# Patient Record
Sex: FEMALE | Race: WHITE | NOT HISPANIC OR LATINO | Employment: UNEMPLOYED | URBAN - NONMETROPOLITAN AREA
[De-identification: names, ages, dates, MRNs, and addresses within clinical notes are randomized per-mention and may not be internally consistent; named-entity substitution may affect disease eponyms.]

---

## 2020-01-22 ENCOUNTER — OFFICE VISIT (OUTPATIENT)
Dept: ORTHOPEDIC SURGERY | Facility: CLINIC | Age: 40
End: 2020-01-22

## 2020-01-22 VITALS — TEMPERATURE: 98.4 F | HEIGHT: 64 IN | WEIGHT: 139 LBS | BODY MASS INDEX: 23.73 KG/M2

## 2020-01-22 DIAGNOSIS — S93.402A SPRAIN OF LEFT ANKLE, UNSPECIFIED LIGAMENT, INITIAL ENCOUNTER: Primary | ICD-10-CM

## 2020-01-22 DIAGNOSIS — S93.602A FOOT SPRAIN, LEFT, INITIAL ENCOUNTER: ICD-10-CM

## 2020-01-22 PROCEDURE — 99203 OFFICE O/P NEW LOW 30 MIN: CPT | Performed by: PHYSICIAN ASSISTANT

## 2020-02-02 PROBLEM — S93.402A SPRAIN OF LEFT ANKLE: Status: ACTIVE | Noted: 2020-02-02

## 2020-02-02 PROBLEM — S93.602A FOOT SPRAIN, LEFT, INITIAL ENCOUNTER: Status: ACTIVE | Noted: 2020-02-02

## 2020-02-02 NOTE — PROGRESS NOTES
NEW VISIT    Patient: Yaquelin Elkins  ?  YOB: 1980    MRN: [id number]  ?  Chief Complaint   Patient presents with   • Left Foot - Establish Care, Pain      ?  HPI:   39 yr old female patient presents with complaint of left foot following injury on 1/7/20. She reports she twisted her ankle and her ankle touched the ground upon the incident. She was seen in the ED at [facility] where xrays off foot and ankle were obtained and she was placed into a cam walking boot for concern she may have a fracture. She states the boot helps her pain at times but other times it feels like her ankle is able to move around too much.    Pain Location: left foot and ankle   Radiation: none  Quality: aching  Intensity/Severity: moderate  Duration: since 1/7/20  Onset quality: sudden  Timing: intermittent  Aggravating Factors: standing  Alleviating Factors: rest, brace  Previous Episodes: none mentioned  Associated Symptoms: pain, swelling, bruising  ADLs Affected: ambulating    This patient is a new patient.  This problem is new to this examiner.      Allergies: Allergies no known allergies    Medications:   Home Medications:  No current outpatient medications on file prior to visit.     No current facility-administered medications on file prior to visit.      Current Medications:  Scheduled Meds:  PRN Meds:.    I have reviewed the patient's medical history in detail and updated the computerized patient record.  Review and summarization of old records include:    No past medical history on file.  No past surgical history on file.  Social History     Occupational History   • Not on file   Tobacco Use   • Smoking status: Not on file   Substance and Sexual Activity   • Alcohol use: Not on file   • Drug use: Not on file   • Sexual activity: Not on file      Social History     Social History Narrative   • Not on file     No family history on file.    Review of Systems  Constitutional: Negative.  Negative for fever.   Eyes: Negative.  "   Respiratory: Negative.    Cardiovascular: Negative.    Endocrine: Negative.    Musculoskeletal: Positive for joint pain, gait problem and joint swelling.   Skin: Negative.  Negative for rash and wound.   Allergic/Immunologic: Negative.    Neurological: Negative for numbness.   Hematological: Negative.    Psychiatric/Behavioral: Negative.           Wt Readings from Last 3 Encounters:   01/22/20 63 kg (139 lb)     Ht Readings from Last 3 Encounters:   01/22/20 162.6 cm (64\")     Body mass index is 23.86 kg/m².  Facility age limit for growth percentiles is 20 years.  Vitals:    01/22/20 1552   Temp: 98.4 °F (36.9 °C)         Physical Exam  Constitutional: Patient is oriented to person, place, and time. Appears well-developed and well-nourished.   HENT:   Head: Normocephalic and atraumatic.   Eyes: Conjunctivae and EOM are normal. Pupils are equal, round, and reactive to light.   Cardiovascular: Normal rate and intact distal pulses.   Pulmonary/Chest: Effort normal and breath sounds normal.   Musculoskeletal:   See detailed exam below   Neurological: Alert and oriented to person, place, and time. No sensory deficit. Coordination normal.   Skin: Skin is warm and dry. Capillary refill takes less than 2 seconds. No rash noted. No erythema.   Psychiatric: Patient has a normal mood and affect. Her behavior is normal. Judgment and thought content normal.   Nursing note and vitals reviewed.      Ortho Exam:   Left ankle/foot-sprain:   Patient has diffuse ill-defined tenderness and swelling over the anterior talofibular ligament. Inversion and eversion against resistance are painful. Some tenderness is noted posteriorly over the calcaneal fibular ligament as well. Medially the deltoid ligament is swollen and tender. Dorsalis pedis and posterior tibial artery pulses are palpable. Common peroneal nerve function is well preserved. There is no evidence of a proximal fibular injury. Distal tibiofibular syndesmotic ligament " appears to be intact. External rotation and squeeze tests are both negative.         Diagnostics:  Independently viewed and interpreted outside xrays of left foot and ankle, from 1/7/20 performed at [facility], my impression below:  · No acute bony abnormality in foot or ankle      Reviewed radiology report of xrays of left foot and ankle, from 1/7/20, summary of impression below:  · No acute bony abnormality       Assessment:  Yaquelin was seen today for establish care and pain.    Diagnoses and all orders for this visit:    Sprain of left ankle, unspecified ligament, initial encounter    Foot sprain, left, initial encounter    ?    Plan    · Patient was refitted for a smaller size cam walking boot and the boot received in the ER was exchanged for a smaller size. Patient is able to bear weight as tolerated with boot on. Since she has already been wearing a boot for 2 weeks I will have her return in 2 weeks to check her healing progress.  · Rest, ice, compression, and elevation (RICE) therapy  · Stretching and strengthening exercises  · OTC Alternate Ibuprofen and Tylenol as needed    Date of encounter: 01/22/2020   Ishmael Ward PA-C    Electronically signed by Ishmael Ward PA-C, 02/02/20, 1:58 PM.

## 2022-05-12 ENCOUNTER — TRANSCRIBE ORDERS (OUTPATIENT)
Dept: ADMINISTRATIVE | Facility: HOSPITAL | Age: 42
End: 2022-05-12

## 2022-05-12 DIAGNOSIS — Z12.31 ENCOUNTER FOR SCREENING MAMMOGRAM FOR MALIGNANT NEOPLASM OF BREAST: Primary | ICD-10-CM

## 2022-06-16 ENCOUNTER — HOSPITAL ENCOUNTER (OUTPATIENT)
Dept: MAMMOGRAPHY | Facility: HOSPITAL | Age: 42
Discharge: HOME | End: 2022-06-16
Admitting: NURSE PRACTITIONER

## 2022-06-16 DIAGNOSIS — Z12.31 ENCOUNTER FOR SCREENING MAMMOGRAM FOR MALIGNANT NEOPLASM OF BREAST: ICD-10-CM

## 2022-06-16 PROCEDURE — 77063 BREAST TOMOSYNTHESIS BI: CPT

## 2022-06-16 PROCEDURE — 77067 SCR MAMMO BI INCL CAD: CPT
